# Patient Record
Sex: MALE | Race: BLACK OR AFRICAN AMERICAN | NOT HISPANIC OR LATINO | Employment: FULL TIME | URBAN - METROPOLITAN AREA
[De-identification: names, ages, dates, MRNs, and addresses within clinical notes are randomized per-mention and may not be internally consistent; named-entity substitution may affect disease eponyms.]

---

## 2020-10-28 LAB
CRC RECOMMENDATION EXT: NORMAL
CRC RECOMMENDATION EXT: NORMAL

## 2021-07-14 ENCOUNTER — HISTORICAL (OUTPATIENT)
Dept: ADMINISTRATIVE | Facility: HOSPITAL | Age: 56
End: 2021-07-14

## 2021-07-14 LAB
RET# (OHS): 0.06 X10^6/ML (ref 0.03–0.1)
RETICULOCYTE COUNT AUTOMATED (OLG): 1.3 % (ref 1.1–2.1)

## 2022-11-24 ENCOUNTER — HOSPITAL ENCOUNTER (EMERGENCY)
Facility: HOSPITAL | Age: 57
Discharge: HOME | End: 2022-11-24
Attending: STUDENT IN AN ORGANIZED HEALTH CARE EDUCATION/TRAINING PROGRAM
Payer: COMMERCIAL

## 2022-11-24 VITALS
OXYGEN SATURATION: 99 % | SYSTOLIC BLOOD PRESSURE: 103 MMHG | BODY MASS INDEX: 21.43 KG/M2 | DIASTOLIC BLOOD PRESSURE: 64 MMHG | TEMPERATURE: 99 F | WEIGHT: 167 LBS | HEART RATE: 82 BPM | RESPIRATION RATE: 18 BRPM | HEIGHT: 74 IN

## 2022-11-24 DIAGNOSIS — B34.9 VIRAL SYNDROME: ICD-10-CM

## 2022-11-24 DIAGNOSIS — R53.1 WEAKNESS GENERALIZED: Primary | ICD-10-CM

## 2022-11-24 LAB
ABS NEUT (OLG): 5.92 X10(3)/MCL (ref 2.1–9.2)
ALBUMIN SERPL-MCNC: 3 GM/DL (ref 3.5–5)
ALBUMIN/GLOB SERPL: 0.8 RATIO (ref 1.1–2)
ALP SERPL-CCNC: 80 UNIT/L (ref 40–150)
ALT SERPL-CCNC: 105 UNIT/L (ref 0–55)
APPEARANCE UR: CLEAR
AST SERPL-CCNC: 115 UNIT/L (ref 5–34)
BACTERIA #/AREA URNS AUTO: ABNORMAL /HPF
BILIRUB UR QL STRIP.AUTO: NEGATIVE MG/DL
BILIRUBIN DIRECT+TOT PNL SERPL-MCNC: 1.6 MG/DL
BUN SERPL-MCNC: 16 MG/DL (ref 8.4–25.7)
CALCIUM SERPL-MCNC: 9.1 MG/DL (ref 8.4–10.2)
CHLORIDE SERPL-SCNC: 97 MMOL/L (ref 98–107)
CO2 SERPL-SCNC: 27 MMOL/L (ref 22–29)
COLOR UR AUTO: YELLOW
CREAT SERPL-MCNC: 1.19 MG/DL (ref 0.73–1.18)
EOSINOPHIL NFR BLD MANUAL: 0.07 X10(3)/MCL (ref 0–0.9)
EOSINOPHIL NFR BLD MANUAL: 1 %
ERYTHROCYTE [DISTWIDTH] IN BLOOD BY AUTOMATED COUNT: 13.4 % (ref 11.5–17)
FLUAV AG UPPER RESP QL IA.RAPID: NOT DETECTED
FLUBV AG UPPER RESP QL IA.RAPID: NOT DETECTED
GFR SERPLBLD CREATININE-BSD FMLA CKD-EPI: >60 MLS/MIN/1.73/M2
GLOBULIN SER-MCNC: 4 GM/DL (ref 2.4–3.5)
GLUCOSE SERPL-MCNC: 127 MG/DL (ref 74–100)
GLUCOSE UR QL STRIP.AUTO: NEGATIVE MG/DL
HCT VFR BLD AUTO: 36.7 % (ref 42–52)
HGB BLD-MCNC: 12.2 GM/DL (ref 14–18)
IMM GRANULOCYTES # BLD AUTO: 0.05 X10(3)/MCL (ref 0–0.04)
IMM GRANULOCYTES NFR BLD AUTO: 0.7 %
INSTRUMENT WBC (OLG): 7.4 X10(3)/MCL
KETONES UR QL STRIP.AUTO: NEGATIVE MG/DL
LEUKOCYTE ESTERASE UR QL STRIP.AUTO: NEGATIVE UNIT/L
LYMPHOCYTES NFR BLD MANUAL: 1.18 X10(3)/MCL
LYMPHOCYTES NFR BLD MANUAL: 16 %
MCH RBC QN AUTO: 26.5 PG (ref 27–31)
MCHC RBC AUTO-ENTMCNC: 33.2 MG/DL (ref 33–36)
MCV RBC AUTO: 79.8 FL (ref 80–94)
MONOCYTES NFR BLD MANUAL: 0.3 X10(3)/MCL (ref 0.1–1.3)
MONOCYTES NFR BLD MANUAL: 4 %
NEUTROPHILS NFR BLD MANUAL: 80 %
NITRITE UR QL STRIP.AUTO: NEGATIVE
NRBC BLD AUTO-RTO: 0 %
PH UR STRIP.AUTO: 5.5 [PH]
PLATELET # BLD AUTO: 191 X10(3)/MCL (ref 130–400)
PLATELET # BLD EST: NORMAL 10*3/UL
PMV BLD AUTO: 11.5 FL (ref 7.4–10.4)
POTASSIUM SERPL-SCNC: 3.6 MMOL/L (ref 3.5–5.1)
PROT SERPL-MCNC: 7 GM/DL (ref 6.4–8.3)
PROT UR QL STRIP.AUTO: ABNORMAL MG/DL
RBC # BLD AUTO: 4.6 X10(6)/MCL (ref 4.7–6.1)
RBC #/AREA URNS AUTO: <5 /HPF
RBC MORPH BLD: NORMAL
RBC UR QL AUTO: ABNORMAL UNIT/L
SARS-COV-2 RNA RESP QL NAA+PROBE: NOT DETECTED
SODIUM SERPL-SCNC: 137 MMOL/L (ref 136–145)
SP GR UR STRIP.AUTO: 1.01 (ref 1–1.03)
SQUAMOUS #/AREA URNS AUTO: <5 /HPF
TROPONIN I SERPL-MCNC: <0.01 NG/ML (ref 0–0.04)
UROBILINOGEN UR STRIP-ACNC: 1 MG/DL
WBC # SPEC AUTO: 7.4 X10(3)/MCL (ref 4.5–11.5)
WBC #/AREA URNS AUTO: <5 /HPF
YEAST URNS QL MICRO: ABNORMAL /HPF

## 2022-11-24 PROCEDURE — 81003 URINALYSIS AUTO W/O SCOPE: CPT | Performed by: NURSE PRACTITIONER

## 2022-11-24 PROCEDURE — 81001 URINALYSIS AUTO W/SCOPE: CPT | Performed by: NURSE PRACTITIONER

## 2022-11-24 PROCEDURE — 99285 EMERGENCY DEPT VISIT HI MDM: CPT | Mod: 25

## 2022-11-24 PROCEDURE — 85007 BL SMEAR W/DIFF WBC COUNT: CPT | Performed by: NURSE PRACTITIONER

## 2022-11-24 PROCEDURE — 84484 ASSAY OF TROPONIN QUANT: CPT | Performed by: NURSE PRACTITIONER

## 2022-11-24 PROCEDURE — 0240U COVID/FLU A&B PCR: CPT | Performed by: NURSE PRACTITIONER

## 2022-11-24 PROCEDURE — 80053 COMPREHEN METABOLIC PANEL: CPT | Performed by: NURSE PRACTITIONER

## 2022-11-24 PROCEDURE — 93010 ELECTROCARDIOGRAM REPORT: CPT | Mod: ,,, | Performed by: INTERNAL MEDICINE

## 2022-11-24 PROCEDURE — 93010 EKG 12-LEAD: ICD-10-PCS | Mod: ,,, | Performed by: INTERNAL MEDICINE

## 2022-11-24 PROCEDURE — 25000003 PHARM REV CODE 250: Performed by: NURSE PRACTITIONER

## 2022-11-24 PROCEDURE — 93005 ELECTROCARDIOGRAM TRACING: CPT

## 2022-11-24 RX ORDER — BENAZEPRIL HYDROCHLORIDE 20 MG/1
20 TABLET ORAL DAILY
COMMUNITY
Start: 2022-10-27 | End: 2023-11-14 | Stop reason: SDUPTHER

## 2022-11-24 RX ORDER — HYDROCHLOROTHIAZIDE 12.5 MG/1
12.5 CAPSULE ORAL DAILY
COMMUNITY
Start: 2022-10-27 | End: 2023-08-09 | Stop reason: ALTCHOICE

## 2022-11-24 RX ORDER — LEVOFLOXACIN 500 MG/1
500 TABLET, FILM COATED ORAL DAILY
COMMUNITY
Start: 2022-11-22 | End: 2023-08-09 | Stop reason: ALTCHOICE

## 2022-11-24 RX ORDER — ALLOPURINOL 300 MG/1
300 TABLET ORAL DAILY
COMMUNITY
Start: 2022-09-02 | End: 2023-08-15 | Stop reason: SDUPTHER

## 2022-11-24 RX ADMIN — SODIUM CHLORIDE 1000 ML: 9 INJECTION, SOLUTION INTRAVENOUS at 09:11

## 2022-11-24 NOTE — FIRST PROVIDER EVALUATION
"Medical screening examination initiated.  I have conducted a focused provider triage encounter, findings are as follows:    Brief history of present illness:  Patient states fever and generalized weakness.    Vitals:    11/24/22 1718   BP: 103/64   Pulse: 82   Resp: 18   Temp: 98.9 °F (37.2 °C)   SpO2: 99%   Weight: 75.8 kg (167 lb)   Height: 6' 2" (1.88 m)       Pertinent physical exam:  Awake, alert, ambulatory      Brief workup plan:  labs    Preliminary workup initiated; this workup will be continued and followed by the physician or advanced practice provider that is assigned to the patient when roomed.  "

## 2022-11-25 NOTE — ED PROVIDER NOTES
Encounter Date: 11/24/2022       History     Chief Complaint   Patient presents with    Fever     Patient reports fever for 4 days, also reports weakness     Patient is a 57-year-old male  that presents with fever that has been present few days. Associated symptoms epigastric pain intermittent. Surrounding information is went to his PCP and was prescribed Levaquin. Exacerbated by nothing. Relieved by nothing. Patient treatment prior to arrival Levaquin. Risk factors include none. Other history pertaining to this complaint nothing.       The history is provided by the patient. No  was used.   Review of patient's allergies indicates:  No Known Allergies  Past Medical History:   Diagnosis Date    Hypertension      Past Surgical History:   Procedure Laterality Date    none       History reviewed. No pertinent family history.  Social History     Tobacco Use    Smoking status: Former     Types: Cigarettes    Smokeless tobacco: Never   Substance Use Topics    Alcohol use: Yes     Comment: beer daily    Drug use: Not Currently     Review of Systems   Constitutional:  Positive for fever.   Respiratory:  Negative for cough and shortness of breath.    Cardiovascular:  Negative for chest pain.   Gastrointestinal:  Positive for abdominal pain.   Genitourinary:  Negative for difficulty urinating and dysuria.   Musculoskeletal:  Negative for gait problem.   Skin:  Negative for color change.   Neurological:  Negative for dizziness, speech difficulty and headaches.   Psychiatric/Behavioral:  Negative for hallucinations and suicidal ideas.    All other systems reviewed and are negative.    Physical Exam     Initial Vitals [11/24/22 1718]   BP Pulse Resp Temp SpO2   103/64 82 18 98.9 °F (37.2 °C) 99 %      MAP       --         Physical Exam    Nursing note and vitals reviewed.  Constitutional: He appears well-developed and well-nourished.   HENT:   Head: Normocephalic.   Eyes: EOM are normal.   Neck: Neck supple.    Normal range of motion.  Cardiovascular:  Normal rate, regular rhythm, normal heart sounds and intact distal pulses.           Pulmonary/Chest: Breath sounds normal.   Abdominal: Abdomen is soft. Bowel sounds are normal.   Mild right upper quadrant tenderness   Musculoskeletal:         General: Normal range of motion.      Cervical back: Normal range of motion and neck supple.     Neurological: He is alert and oriented to person, place, and time. He has normal strength.   Skin: Skin is warm and dry. Capillary refill takes less than 2 seconds.   Psychiatric: He has a normal mood and affect. His behavior is normal. Judgment and thought content normal.       ED Course   Procedures  Labs Reviewed   COMPREHENSIVE METABOLIC PANEL - Abnormal; Notable for the following components:       Result Value    Chloride 97 (*)     Glucose Level 127 (*)     Creatinine 1.19 (*)     Albumin Level 3.0 (*)     Globulin 4.0 (*)     Albumin/Globulin Ratio 0.8 (*)     Bilirubin Total 1.6 (*)     Alanine Aminotransferase 105 (*)     Aspartate Aminotransferase 115 (*)     All other components within normal limits   URINALYSIS, REFLEX TO URINE CULTURE - Abnormal; Notable for the following components:    Protein, UA 1+ (*)     Blood, UA 1+ (*)     All other components within normal limits   CBC WITH DIFFERENTIAL - Abnormal; Notable for the following components:    RBC 4.60 (*)     Hgb 12.2 (*)     Hct 36.7 (*)     MCV 79.8 (*)     MCH 26.5 (*)     MPV 11.5 (*)     IG# 0.05 (*)     All other components within normal limits   URINALYSIS, MICROSCOPIC - Abnormal; Notable for the following components:    Yeast, UA Rare (*)     All other components within normal limits   TROPONIN I - Normal   COVID/FLU A&B PCR - Normal    Narrative:     The Xpert Xpress SARS-CoV-2/FLU/RSV plus is a rapid, multiplexed real-time PCR test intended for the simultaneous qualitative detection and differentiation of SARS-CoV-2, Influenza A, Influenza B, and respiratory  syncytial virus (RSV) viral RNA in either nasopharyngeal swab or nasal swab specimens.         CBC W/ AUTO DIFFERENTIAL    Narrative:     The following orders were created for panel order CBC Auto Differential.  Procedure                               Abnormality         Status                     ---------                               -----------         ------                     CBC with Differential[[id number]]        Abnormal            Final result               Manual Differential[[id number]]                              Final result                 Please view results for these tests on the individual orders.   MANUAL DIFFERENTIAL          Imaging Results              US Abdomen Limited_Gallbladder (Preliminary result)  Result time 11/24/22 22:28:22      Preliminary result by Dre Chiu MD (11/24/22 22:28:22)                   Narrative:    START OF REPORT:  Technique: Limited right upper quadrant abdominal ultrasound was performed.    Comparison: None.    Clinical History: Epigastric pain.    Findings:  Liver: The liver to the extent visualized appears unremarkable.  Biliary ducts: The common bile duct is within normal limits at 2 mm in diameter.  Gallbladder: The gallbladder appears unremarkable with no stones wall thickening (2) mm or pericholecyctic fluid. The sonographic Johnson's sign is negative.  Vascular:  Portal vein: Flow parameters are within normal limits with hepatopetal flow.      Impression:  1. Unremarkable gallbladder ultrasound. Details and other findings as noted above.                          Preliminary result by Kauli, Rad Results In (11/24/22 22:28:22)                   Narrative:    START OF REPORT:  Technique: Limited right upper quadrant abdominal ultrasound was performed.    Comparison: None.    Clinical History: Epigastric pain.    Findings:  Liver: The liver to the extent visualized appears unremarkable.  Biliary ducts: The common bile duct is within normal limits at 2  mm in diameter.  Gallbladder: The gallbladder appears unremarkable with no stones wall thickening (2) mm or pericholecyctic fluid. The sonographic Johnson's sign is negative.  Vascular:  Portal vein: Flow parameters are within normal limits with hepatopetal flow.      Impression:  1. Unremarkable gallbladder ultrasound. Details and other findings as noted above.                                         X-Ray Chest PA And Lateral (Preliminary result)  Result time 11/24/22 20:31:50      ED Interpretation by SUSSY Haskins (11/24/22 20:31:50, [facility], Emergency Medicine)    No acute findings                                     Medications   sodium chloride 0.9% bolus 1,000 mL (1,000 mLs Intravenous New Bag 11/24/22 2115)                 ED Course as of 11/24/22 2212   Thu Nov 24, 2022 2212 Patient will follow up with his PCP to recheck liver functions [CL]      ED Course User Index  [CL] SUSSY Haskins                 Clinical Impression:   Final diagnoses:  [R53.1] Weakness generalized (Primary)  [B34.9] Viral syndrome        ED Disposition Condition    Discharge Stable          ED Prescriptions    None       Follow-up Information       Follow up With Specialties Details Why Contact Info    Your Primary Care Provider  Call in 3 days ed follow up              SUSSY Haskins  11/24/22 2212

## 2023-01-19 LAB
CHOLEST SERPL-MSCNC: 212 MG/DL (ref 0–200)
EGFR: 92.8
HBA1C MFR BLD: 5.6 % (ref 4–6)
HDLC SERPL-MCNC: 46 MG/DL (ref 35–70)
LDLC SERPL CALC-MCNC: 74 MG/DL (ref 0–160)
TRIGL SERPL-MCNC: 461 MG/DL (ref 40–160)
VLDL CHOLESTEROL: 92.2 MG/DL

## 2023-07-25 LAB
CHOLEST SERPL-MSCNC: 196 MG/DL (ref 0–200)
EGFR: 101
HBA1C MFR BLD: 5.9 % (ref 4–6)
HDLC SERPL-MCNC: 37 MG/DL (ref 35–70)
LDLC SERPL CALC-MCNC: 88 MG/DL (ref 0–160)
TRIGL SERPL-MCNC: 432 MG/DL (ref 40–160)
VLDL CHOLESTEROL: 71 MG/DL

## 2023-08-04 PROBLEM — E79.0 HYPERURICEMIA: Status: ACTIVE | Noted: 2023-08-04

## 2023-08-04 PROBLEM — I10 ESSENTIAL HYPERTENSION, BENIGN: Status: ACTIVE | Noted: 2023-08-04

## 2023-08-05 ENCOUNTER — TELEPHONE (OUTPATIENT)
Dept: FAMILY MEDICINE | Facility: CLINIC | Age: 58
End: 2023-08-05
Payer: COMMERCIAL

## 2023-08-05 RX ORDER — AMOXICILLIN 500 MG
1 CAPSULE ORAL EVERY OTHER DAY
COMMUNITY

## 2023-08-05 RX ORDER — IBUPROFEN 100 MG/5ML
1000 SUSPENSION, ORAL (FINAL DOSE FORM) ORAL EVERY OTHER DAY
COMMUNITY

## 2023-08-05 RX ORDER — GLUCOSAMINE HCL 500 MG
TABLET ORAL DAILY
COMMUNITY

## 2023-08-05 RX ORDER — ZINC GLUCONATE 50 MG
50 TABLET ORAL EVERY OTHER DAY
COMMUNITY

## 2023-08-07 PROBLEM — R79.89 ELEVATED LFTS: Status: ACTIVE | Noted: 2023-08-07

## 2023-08-07 PROBLEM — D57.1 SICKLE-CELL DISEASE WITHOUT CRISIS: Status: ACTIVE | Noted: 2023-08-07

## 2023-08-07 PROBLEM — E78.5 HYPERLIPIDEMIA: Status: ACTIVE | Noted: 2023-08-07

## 2023-08-07 PROBLEM — D64.9 ANEMIA, UNSPECIFIED: Status: ACTIVE | Noted: 2023-08-07

## 2023-08-07 PROBLEM — Z78.9 ALCOHOL DRINKER: Status: ACTIVE | Noted: 2023-08-07

## 2023-08-09 ENCOUNTER — OFFICE VISIT (OUTPATIENT)
Dept: FAMILY MEDICINE | Facility: CLINIC | Age: 58
End: 2023-08-09
Payer: COMMERCIAL

## 2023-08-09 VITALS
HEART RATE: 51 BPM | TEMPERATURE: 97 F | OXYGEN SATURATION: 98 % | WEIGHT: 174.38 LBS | DIASTOLIC BLOOD PRESSURE: 80 MMHG | SYSTOLIC BLOOD PRESSURE: 122 MMHG | HEIGHT: 74 IN | BODY MASS INDEX: 22.38 KG/M2 | RESPIRATION RATE: 16 BRPM

## 2023-08-09 DIAGNOSIS — E79.0 HYPERURICEMIA: ICD-10-CM

## 2023-08-09 DIAGNOSIS — E78.2 MIXED HYPERLIPIDEMIA: Primary | ICD-10-CM

## 2023-08-09 DIAGNOSIS — R73.03 PREDIABETES: ICD-10-CM

## 2023-08-09 DIAGNOSIS — I10 ESSENTIAL HYPERTENSION, BENIGN: ICD-10-CM

## 2023-08-09 DIAGNOSIS — Z79.899 ENCOUNTER FOR LONG-TERM (CURRENT) USE OF OTHER MEDICATIONS: ICD-10-CM

## 2023-08-09 DIAGNOSIS — Z87.891 EX-SMOKER: ICD-10-CM

## 2023-08-09 PROBLEM — R73.9 HYPERGLYCEMIA: Status: ACTIVE | Noted: 2023-08-09

## 2023-08-09 PROCEDURE — 3008F BODY MASS INDEX DOCD: CPT | Mod: CPTII,,, | Performed by: FAMILY MEDICINE

## 2023-08-09 PROCEDURE — 4010F ACE/ARB THERAPY RXD/TAKEN: CPT | Mod: CPTII,,, | Performed by: FAMILY MEDICINE

## 2023-08-09 PROCEDURE — 3079F PR MOST RECENT DIASTOLIC BLOOD PRESSURE 80-89 MM HG: ICD-10-PCS | Mod: CPTII,,, | Performed by: FAMILY MEDICINE

## 2023-08-09 PROCEDURE — 3074F SYST BP LT 130 MM HG: CPT | Mod: CPTII,,, | Performed by: FAMILY MEDICINE

## 2023-08-09 PROCEDURE — 3044F PR MOST RECENT HEMOGLOBIN A1C LEVEL <7.0%: ICD-10-PCS | Mod: CPTII,,, | Performed by: FAMILY MEDICINE

## 2023-08-09 PROCEDURE — 3079F DIAST BP 80-89 MM HG: CPT | Mod: CPTII,,, | Performed by: FAMILY MEDICINE

## 2023-08-09 PROCEDURE — 3044F HG A1C LEVEL LT 7.0%: CPT | Mod: CPTII,,, | Performed by: FAMILY MEDICINE

## 2023-08-09 PROCEDURE — 3074F PR MOST RECENT SYSTOLIC BLOOD PRESSURE < 130 MM HG: ICD-10-PCS | Mod: CPTII,,, | Performed by: FAMILY MEDICINE

## 2023-08-09 PROCEDURE — 99214 OFFICE O/P EST MOD 30 MIN: CPT | Mod: ,,, | Performed by: FAMILY MEDICINE

## 2023-08-09 PROCEDURE — 4010F PR ACE/ARB THEARPY RXD/TAKEN: ICD-10-PCS | Mod: CPTII,,, | Performed by: FAMILY MEDICINE

## 2023-08-09 PROCEDURE — 99214 PR OFFICE/OUTPT VISIT, EST, LEVL IV, 30-39 MIN: ICD-10-PCS | Mod: ,,, | Performed by: FAMILY MEDICINE

## 2023-08-09 PROCEDURE — 3008F PR BODY MASS INDEX (BMI) DOCUMENTED: ICD-10-PCS | Mod: CPTII,,, | Performed by: FAMILY MEDICINE

## 2023-08-09 NOTE — PROGRESS NOTES
Patient Name: Elliot Espinosa     Patient ID: [id number]     Chief Complaint: Results (Go over lab results.)      HPI:     Elliot Espinosa is a 57 y.o. male here today for lab work results. Reviewed and discussed lab work results. Patient has no specific complaints.    Past Medical History:   Diagnosis Date    Alcohol drinker     Anemia, unspecified     Elevated LFTs     Elevated lipids     Essential hypertension, benign     Hyperglycemia     Hyperuricemia     sickle cell trait     Vitamin deficiency         Past Surgical History:   Procedure Laterality Date    COLONOSCOPY      none          Social History     Socioeconomic History    Marital status:     Number of children: 1   Tobacco Use    Smoking status: Former     Current packs/day: 0.00     Types: Cigarettes    Smokeless tobacco: Never   Substance and Sexual Activity    Alcohol use: Yes     Comment: beer daily    Drug use: Never    Sexual activity: Yes        Current Outpatient Medications   Medication Instructions    allopurinoL (ZYLOPRIM) 300 mg, Oral, Daily    ascorbic acid (vitamin C) (VITAMIN C) 1,000 mg, Oral, Every other day    benazepriL (LOTENSIN) 20 mg, Oral, Daily    cholecalciferol, vitamin D3, 75 mcg (3,000 unit) Tab Oral, Daily    omega-3 fatty acids/fish oil (FISH OIL-OMEGA-3 FATTY ACIDS) 300-1,000 mg capsule 1 capsule, Oral, Every other day    zinc gluconate 50 mg, Oral, Every other day       Review of patient's allergies indicates:   Allergen Reactions    Augmentin [amoxicillin-pot clavulanate]     Pcn [penicillins]           There is no immunization history on file for this patient.    Patient Care Team:  Monty Camara Sr., MD as PCP - General (Family Medicine)     Subjective:     Review of Systems    10 point review of systems conducted, negative except as stated in the history of present illness. See HPI for details.    Objective:     Visit Vitals  /80 (BP Location: Left arm, Patient Position: Sitting, BP Method:  "Medium (Manual))   Pulse (!) 51   Temp 97 °F (36.1 °C)   Resp 16   Ht 6' 2" (1.88 m)   Wt 79.1 kg (174 lb 6.4 oz)   SpO2 98%   BMI 22.39 kg/m²       Physical Exam  Constitutional:       Appearance: Normal appearance.   HENT:      Head: Normocephalic and atraumatic.   Cardiovascular:      Rate and Rhythm: Normal rate and regular rhythm.   Pulmonary:      Effort: Pulmonary effort is normal.      Breath sounds: Normal breath sounds.   Abdominal:      Palpations: Abdomen is soft.      Tenderness: There is no abdominal tenderness.   Musculoskeletal:         General: No swelling or tenderness. Normal range of motion.      Cervical back: Normal range of motion and neck supple.      Right lower leg: No edema.      Left lower leg: No edema.   Lymphadenopathy:      Cervical: No cervical adenopathy.   Skin:     General: Skin is warm and dry.   Neurological:      General: No focal deficit present.      Mental Status: He is alert and oriented to person, place, and time.   Psychiatric:         Mood and Affect: Mood normal.           Assessment:       ICD-10-CM ICD-9-CM   1. Mixed hyperlipidemia  E78.2 272.2   2. Prediabetes  R73.03 790.29   3. Essential hypertension, benign  I10 401.1   4. Hyperuricemia  E79.0 790.6   5. Ex-smoker  Z87.891 V15.82   6. Encounter for long-term (current) use of other medications  Z79.899 V58.69        Plan:     1. Mixed hyperlipidemia  Overview:  Patient TGY is not at goal.  Stressed importance of dietary modifications. Follow a low cholesterol, low saturated fat diet with less that 200mg of cholesterol a day.  Avoid fried foods and high saturated fats (high saturated fats less than 7% of calories).  Add Flax Seed/Fish Oil supplements to diet. Increase dietary fiber.  Regular exercise can reduce LDL and raise HDL. Stressed importance of physical activity 5 times per week for 30 minutes per day.     Assessment & Plan:  Most recent  mg/dL and LDL 88 mg/dL on 7/25/2023.  Will increase OTC Fish " Oil-Omega-3 Fatty Acids 300-1000mg to 1 pill QAM and 2 pills HS.  Patient has not been very compliant with his fish oil regimen.  Compliance was reinforced.    Orders:  -     Lipid Panel; Future; Expected date: 12/09/2023    2. Prediabetes  Overview:  Patient is well controlled with diet.  Follow ADA Diet. Avoid soda, simple sweets, and limit rice/pasta/breads/starches (no more than 45-50 grams per meal).  Maintain healthy weight with goal BMI <30.  Exercise 5 times per week for 30 minutes per day.  Stressed importance of daily foot exams especially if neuropathy is present.  Patient was instructed to notify physician if they notice any sores or skin lesions on the feet.  Stressed the importance of wearing proper fitting comfortable shoes i.e. diabetic footwear.  They were instructed to always wear shoes and never go barefooted.  Stressed importance of annual dilated eye exam.    Assessment & Plan:  Most recent HbA1c 5.9% on 7/25/2023.    Orders:  -     Hemoglobin A1C; Future; Expected date: 12/09/2023    3. Essential hypertension, benign  Overview:  Continue with Benazepril 20 mg daily.  Patient is well controlled.  Low Sodium Diet (DASH Diet - Less than 2 grams of sodium per day).  Monitor blood pressure daily and log. Report consistent numbers greater than 140/90.  Maintain healthy weight with goal BMI <30. Exercise 30 minutes per day, 5 days per week.      4. Hyperuricemia  Overview:  Patient is well controlled.  Stay well hydrated by increasing water intake throughout the day.  Stressed importance of exercise and weight loss to maintain BMI <30.  Avoid alcohol, sodas, organ/glandular meats (liver, kidney, sweetbreads). Limit seafood and red meat intake.  Eat a balanced diet of fruits, vegetables, complex carbohydrates, and lean sources of protein (boneless/skinless chicken breasts, salmon, lentils, low fat dairy).  Discussed possible benefit of OTC Vitamin C supplementation.     Assessment & Plan:  Most recent  Uric Acid 3.8 mg/dL on 7/25/2023.  Will decrease Allopurinol 300 mg to QOD and will continue to monitor.    Orders:  -     Uric Acid; Future; Expected date: 12/09/2023    5. Ex-smoker  -     X-Ray Chest PA And Lateral; Future; Expected date: 08/09/2023    6. Encounter for long-term (current) use of other medications  Overview:  Patient was instructed to continue with the prescribed medications as no adverse or cost issues were found.   Refills were given if needed.  Compliance issues were discussed as far as medications that patient is currently taking.  Discussed the possibility of getting off some medications that were not absolutely necessary.    Orders:  -     Testosterone Panel; Future; Expected date: 12/09/2023        [x] Discussed lab findings with the patient.  [x] Discussed diet, exercise and if appropriate, weight loss.  [x] Instructions and information, including risks and benefits of prescribed medication(s) have been reviewed with the patient and patient verbalizes understanding. Questions have been answered to the patient's satisfaction.  [] Appropriate counseling has been given regarding anxiety and depressive issues that were discussed today.  [] Any lab drawn today will be reviewed by physician at the time it is received and appropriate recommendations bill be made and discussed with patient.     Follow up in about 4 months (around 12/9/2023) for Follow Up, With Fasting Labs prior to visit.   In addition to their scheduled follow up, the patient has also been instructed to follow up on as needed basis.     Future Appointments   Date Time Provider Department Center   12/7/2023  1:40 PM NURSE, [facility] HARJIT Camara Sr, MD

## 2023-08-09 NOTE — ASSESSMENT & PLAN NOTE
Most recent Uric Acid 3.8 mg/dL on 7/25/2023.  Will decrease Allopurinol 300 mg to QOD and will continue to monitor.

## 2023-08-09 NOTE — ASSESSMENT & PLAN NOTE
Most recent  mg/dL and LDL 88 mg/dL on 7/25/2023.  Will increase OTC Fish Oil-Omega-3 Fatty Acids 300-1000mg to 1 pill QAM and 2 pills HS.  Patient has not been very compliant with his fish oil regimen.  Compliance was reinforced.

## 2023-08-10 ENCOUNTER — DOCUMENTATION ONLY (OUTPATIENT)
Dept: FAMILY MEDICINE | Facility: CLINIC | Age: 58
End: 2023-08-10
Payer: COMMERCIAL

## 2023-08-15 DIAGNOSIS — E79.0 HYPERURICEMIA: Primary | ICD-10-CM

## 2023-08-15 RX ORDER — ALLOPURINOL 300 MG/1
300 TABLET ORAL DAILY
Qty: 90 TABLET | Refills: 1 | Status: SHIPPED | OUTPATIENT
Start: 2023-08-15

## 2023-08-18 ENCOUNTER — TELEPHONE (OUTPATIENT)
Dept: FAMILY MEDICINE | Facility: CLINIC | Age: 58
End: 2023-08-18
Payer: COMMERCIAL

## 2023-08-18 NOTE — TELEPHONE ENCOUNTER
Spoke with patient and informed him he was not taken off of that medicine and it was filled at [facility] on 8/15/2023 and should be ready for .   central venous access

## 2023-08-18 NOTE — TELEPHONE ENCOUNTER
----- Message from Eliane Storey sent at 8/17/2023  3:24 PM CDT -----  Regarding: Medication  Did Doc take him off his Gout medicine?  [facility]

## 2023-09-11 ENCOUNTER — TELEPHONE (OUTPATIENT)
Dept: FAMILY MEDICINE | Facility: CLINIC | Age: 58
End: 2023-09-11
Payer: COMMERCIAL

## 2023-09-11 NOTE — TELEPHONE ENCOUNTER
----- Message from Monty Camara Sr., MD sent at 8/19/2023 11:17 AM CDT -----  We recently did a chest x-ray on Elliot .  Overall it looked okay, there appears to be some mild scarring from past smoking.  We will repeat the chest x-ray or CT scan in 2 years.  ----- Message -----  From: Paz Jha MA  Sent: 8/18/2023   9:59 AM CDT  To: Monty Camara Sr., MD

## 2023-11-14 DIAGNOSIS — I10 ESSENTIAL HYPERTENSION, BENIGN: Primary | ICD-10-CM

## 2023-11-14 RX ORDER — BENAZEPRIL HYDROCHLORIDE 20 MG/1
20 TABLET ORAL DAILY
Qty: 90 TABLET | Refills: 1 | Status: SHIPPED | OUTPATIENT
Start: 2023-11-14

## 2023-12-26 ENCOUNTER — TELEPHONE (OUTPATIENT)
Dept: FAMILY MEDICINE | Facility: CLINIC | Age: 58
End: 2023-12-26
Payer: COMMERCIAL

## 2023-12-26 DIAGNOSIS — E79.0 HYPERURICEMIA: Primary | ICD-10-CM

## 2023-12-26 DIAGNOSIS — R73.03 PREDIABETES: ICD-10-CM

## 2023-12-26 DIAGNOSIS — E78.2 MIXED HYPERLIPIDEMIA: ICD-10-CM

## 2023-12-26 NOTE — TELEPHONE ENCOUNTER
----- Message from Monty Camara Sr., MD sent at 12/22/2023  1:27 PM CST -----  Tell patient his triglycerides are a little better however still elevated.  His sugar is good.  We also checked his testosterone which was normal.  Patient is to continue with his present medication.  We will recheck lab and office visit in 3 months.  Lab is to include a lipid panel, CMP, A1c and uric acid.

## 2023-12-26 NOTE — TELEPHONE ENCOUNTER
Notified patient of lab work results are within acceptable ranges for age and conditions. Patient Triglyceride level has improved since previous lab, his HbA1c is 5.8%, and his Testosterone level is normal. No change in therapy is requested at this time and continue with current treatment regimen. Message sent to Evonne to schedule patient for fasting lab work appointment for CMP, Lipid Panel, HbA1c, and Uric Acid prior to follow up office visit with Dr. Camara in 3 months.Patient verbalizes understanding.

## 2024-04-02 ENCOUNTER — OFFICE VISIT (OUTPATIENT)
Dept: FAMILY MEDICINE | Facility: CLINIC | Age: 59
End: 2024-04-02
Payer: COMMERCIAL

## 2024-04-02 VITALS
SYSTOLIC BLOOD PRESSURE: 130 MMHG | DIASTOLIC BLOOD PRESSURE: 85 MMHG | TEMPERATURE: 98 F | RESPIRATION RATE: 17 BRPM | OXYGEN SATURATION: 98 % | WEIGHT: 174.63 LBS | HEIGHT: 74 IN | HEART RATE: 66 BPM | BODY MASS INDEX: 22.41 KG/M2

## 2024-04-02 DIAGNOSIS — Z12.5 SCREENING PSA (PROSTATE SPECIFIC ANTIGEN): ICD-10-CM

## 2024-04-02 DIAGNOSIS — R73.03 PREDIABETES: ICD-10-CM

## 2024-04-02 DIAGNOSIS — D64.9 ANEMIA, UNSPECIFIED TYPE: ICD-10-CM

## 2024-04-02 DIAGNOSIS — E79.0 HYPERURICEMIA: ICD-10-CM

## 2024-04-02 DIAGNOSIS — E78.2 MIXED HYPERLIPIDEMIA: ICD-10-CM

## 2024-04-02 DIAGNOSIS — I10 ESSENTIAL HYPERTENSION, BENIGN: Primary | ICD-10-CM

## 2024-04-02 PROCEDURE — 1159F MED LIST DOCD IN RCRD: CPT | Mod: CPTII,,, | Performed by: FAMILY MEDICINE

## 2024-04-02 PROCEDURE — 3075F SYST BP GE 130 - 139MM HG: CPT | Mod: CPTII,,, | Performed by: FAMILY MEDICINE

## 2024-04-02 PROCEDURE — 99214 OFFICE O/P EST MOD 30 MIN: CPT | Mod: ,,, | Performed by: FAMILY MEDICINE

## 2024-04-02 PROCEDURE — 3079F DIAST BP 80-89 MM HG: CPT | Mod: CPTII,,, | Performed by: FAMILY MEDICINE

## 2024-04-02 PROCEDURE — 4010F ACE/ARB THERAPY RXD/TAKEN: CPT | Mod: CPTII,,, | Performed by: FAMILY MEDICINE

## 2024-04-02 PROCEDURE — 3044F HG A1C LEVEL LT 7.0%: CPT | Mod: CPTII,,, | Performed by: FAMILY MEDICINE

## 2024-04-02 PROCEDURE — 3008F BODY MASS INDEX DOCD: CPT | Mod: CPTII,,, | Performed by: FAMILY MEDICINE

## 2024-04-02 NOTE — ASSESSMENT & PLAN NOTE
Lab Results   Component Value Date    HGBA1C 5.8 (H) 03/27/2024    HGBA1C 5.9 07/25/2023     Patient is at goal.

## 2024-04-02 NOTE — PROGRESS NOTES
Patient Name: Elliot Espinosa     Patient ID: [id number]     Chief Complaint: Results (Go over lab results.)      HPI:     Elliot Espinosa is a 58 y.o. male here today for follow up on hypertension, hyperlipidemia, prediabetes, and lab results.  Past Medical History:   Diagnosis Date    Alcohol drinker     Anemia, unspecified     Elevated LFTs     Elevated lipids     Essential hypertension, benign     Hyperglycemia     Hyperuricemia     sickle cell trait     Vitamin deficiency         Past Surgical History:   Procedure Laterality Date    COLONOSCOPY      none          Social History     Socioeconomic History    Marital status:     Number of children: 1   Tobacco Use    Smoking status: Former     Types: Cigarettes    Smokeless tobacco: Never   Substance and Sexual Activity    Alcohol use: Yes     Comment: beer daily    Drug use: Never    Sexual activity: Yes     Social Determinants of Health     Financial Resource Strain: Low Risk  (4/2/2024)    Overall Financial Resource Strain (CARDIA)     Difficulty of Paying Living Expenses: Not hard at all   Food Insecurity: No Food Insecurity (4/2/2024)    Hunger Vital Sign     Worried About Running Out of Food in the Last Year: Never true     Ran Out of Food in the Last Year: Never true   Transportation Needs: No Transportation Needs (4/2/2024)    PRAPARE - Transportation     Lack of Transportation (Medical): No     Lack of Transportation (Non-Medical): No   Physical Activity: Insufficiently Active (4/2/2024)    Exercise Vital Sign     Days of Exercise per Week: 3 days     Minutes of Exercise per Session: 30 min   Stress: No Stress Concern Present (4/2/2024)    [employer] - Occupational Stress Questionnaire     Feeling of Stress : Not at all   Social Connections: Moderately Integrated (4/2/2024)    Social Connection and Isolation Panel [NHANES]     Frequency of Communication with Friends and Family: Twice a week     Frequency of Social  "Gatherings with Friends and Family: Twice a week     Attends [employer] Services: 1 to 4 times per year     Active Member of Clubs or Organizations: No     Attends Club or Organization Meetings: Never     Marital Status:    Housing Stability: Unknown (4/2/2024)    Housing Stability Vital Sign     Unable to Pay for Housing in the Last Year: No     Unstable Housing in the Last Year: No        Current Outpatient Medications   Medication Instructions    allopurinoL (ZYLOPRIM) 300 mg, Oral, Daily    ascorbic acid (vitamin C) (VITAMIN C) 1,000 mg, Oral, Every other day    benazepriL (LOTENSIN) 20 mg, Oral, Daily    cholecalciferol, vitamin D3, 75 mcg (3,000 unit) Tab Oral, Daily    omega-3 fatty acids/fish oil (FISH OIL-OMEGA-3 FATTY ACIDS) 300-1,000 mg capsule 1 capsule, Oral, Every other day    zinc gluconate 50 mg, Oral, Every other day       Review of patient's allergies indicates:   Allergen Reactions    Augmentin [amoxicillin-pot clavulanate]     Pcn [penicillins]         Immunization History   Administered Date(s) Administered    COVID-19, MRNA, LN-S, PF (Pfizer) (Purple Cap) 07/14/2021, 08/04/2021, 02/25/2022    COVID-19, mRNA, LNP-S, bivalent booster, PF (PFIZER OMICRON) 10/27/2022       Patient Care Team:  Monty Camara Sr., MD as PCP - General (Family Medicine)     Subjective:     Review of Systems    10 point review of systems conducted, negative except as stated in the history of present illness. See HPI for details.    Objective:     Visit Vitals  /85 (BP Location: Left arm, Patient Position: Sitting, BP Method: Medium (Manual))   Pulse 66   Temp 97.8 °F (36.6 °C)   Resp 17   Ht 6' 2" (1.88 m)   Wt 79.2 kg (174 lb 9.6 oz)   SpO2 98%   BMI 22.42 kg/m²       Physical Exam  Constitutional:       Appearance: Normal appearance.   HENT:      Head: Normocephalic and atraumatic.   Cardiovascular:      Rate and Rhythm: Normal rate and regular rhythm.   Pulmonary:      Effort: Pulmonary effort is " normal.      Breath sounds: Normal breath sounds.   Abdominal:      Palpations: Abdomen is soft.      Tenderness: There is no abdominal tenderness.   Musculoskeletal:         General: No swelling or tenderness. Normal range of motion.      Cervical back: Normal range of motion and neck supple.      Right lower leg: No edema.      Left lower leg: No edema.   Lymphadenopathy:      Cervical: No cervical adenopathy.   Skin:     General: Skin is warm and dry.   Neurological:      General: No focal deficit present.      Mental Status: He is alert and oriented to person, place, and time.   Psychiatric:         Mood and Affect: Mood normal.         Assessment:       ICD-10-CM ICD-9-CM   1. Essential hypertension, benign  I10 401.1   2. Mixed hyperlipidemia  E78.2 272.2   3. Prediabetes  R73.03 790.29   4. Hyperuricemia  E79.0 790.6   5. Anemia, unspecified type  D64.9 285.9   6. Screening PSA (prostate specific antigen)  Z12.5 V76.44       Plan:   1. Essential hypertension, benign  Overview:  Continue with Benazepril 20 mg daily.    Low Sodium Diet (DASH Diet - Less than 2 grams of sodium per day).  Monitor blood pressure daily and log. Report consistent numbers greater than 140/90.  Maintain healthy weight with goal BMI <30. Exercise 30 minutes per day, 5 days per week.    Assessment & Plan:  Blood pressure is well controlled and at goal.    Orders:  -     CBC Auto Differential; Future; Expected date: 07/30/2024    2. Mixed hyperlipidemia  Overview:    Stressed importance of dietary modifications. Follow a low cholesterol, low saturated fat diet with less that 200mg of cholesterol a day.  Avoid fried foods and high saturated fats (high saturated fats less than 7% of calories).  Add Flax Seed/Fish Oil supplements to diet. Increase dietary fiber.  Regular exercise can reduce LDL and raise HDL. Stressed importance of physical activity 5 times per week for 30 minutes per day.     Assessment & Plan:  Lab Results   Component  Value Date    TRIG 219 (H) 03/27/2024    HDL 49 03/27/2024    LDLCALC 91 03/27/2024     Patient's LDL is at goal however his triglycerides are elevated.  We have the patient taking fish oil for his triglycerides.  We will continue to monitor.  I did suggest that he have a CT calcium score to assess for any coronary disease.      Orders:  -     Lipid Panel; Future; Expected date: 07/30/2024    3. Prediabetes  Overview:  Patient is well controlled with diet.  Follow ADA Diet. Avoid soda, simple sweets, and limit rice/pasta/breads/starches (no more than 45-50 grams per meal).  Maintain healthy weight with goal BMI <30.  Exercise 5 times per week for 30 minutes per day.  Stressed importance of daily foot exams especially if neuropathy is present.  Patient was instructed to notify physician if they notice any sores or skin lesions on the feet.  Stressed the importance of wearing proper fitting comfortable shoes i.e. diabetic footwear.  They were instructed to always wear shoes and never go barefooted.  Stressed importance of annual dilated eye exam.    Assessment & Plan:  Lab Results   Component Value Date    HGBA1C 5.8 (H) 03/27/2024    HGBA1C 5.9 07/25/2023     Patient is at goal.     Orders:  -     Hemoglobin A1C; Future; Expected date: 07/30/2024    4. Hyperuricemia  Overview:  Current Medication: Allopurinol 300 mg one po qod.  Stay well hydrated by increasing water intake throughout the day.  Stressed importance of exercise and weight loss to maintain BMI <30.  Avoid alcohol, sodas, organ/glandular meats (liver, kidney, sweetbreads). Limit seafood and red meat intake.  Eat a balanced diet of fruits, vegetables, complex carbohydrates, and lean sources of protein (boneless/skinless chicken breasts, salmon, lentils, low fat dairy).  Discussed possible benefit of OTC Vitamin C supplementation.     Assessment & Plan:  03/27/2024 Uric Acid level = 5.6. Level is   well controlled and at goal.      5. Anemia, unspecified  type  Overview:  Patient has a remote history of microcytic anemia.    Assessment & Plan:  We will reassess his anemia status with a CBC, iron and ferritin at his next visit.    Orders:  -     Iron; Future; Expected date: 07/30/2024  -     Ferritin; Future; Expected date: 07/30/2024    6. Screening PSA (prostate specific antigen)  -     PSA, Screening; Future; Expected date: 07/30/2024        [x] Discussed lab findings with the patient.  [x] Discussed diet, exercise and if appropriate, weight loss.  [] Instructions and information, including risks and benefits of prescribed medication(s) have been reviewed with the patient and patient verbalizes understanding. Questions have been answered to the patient's satisfaction.  [] Appropriate counseling has been given regarding anxiety and depressive issues that were discussed today.  [] Any lab drawn today will be reviewed by physician at the time it is received and appropriate recommendations bill be made and discussed with patient.     Follow up in about 4 months (around 8/2/2024) for With Fasting Labs prior to visit.   In addition to their scheduled follow up, the patient has also been instructed to follow up on as needed basis.     Future Appointments   Date Time Provider Department Center   7/30/2024  7:20 AM [facility], [facility]   8/1/2024  3:30 PM Monty Camara Sr., MD [facility] Jb Bourgeois Sr, MD

## 2024-04-02 NOTE — ASSESSMENT & PLAN NOTE
Lab Results   Component Value Date    TRIG 219 (H) 03/27/2024    HDL 49 03/27/2024    LDLCALC 91 03/27/2024     Patient's LDL is at goal however his triglycerides are elevated.  We have the patient taking fish oil for his triglycerides.  We will continue to monitor.  I did suggest that he have a CT calcium score to assess for any coronary disease.

## 2024-04-30 DIAGNOSIS — E79.0 HYPERURICEMIA: ICD-10-CM

## 2024-04-30 DIAGNOSIS — I10 ESSENTIAL HYPERTENSION, BENIGN: ICD-10-CM

## 2024-04-30 RX ORDER — BENAZEPRIL HYDROCHLORIDE 20 MG/1
20 TABLET ORAL DAILY
Qty: 90 TABLET | Refills: 1 | Status: SHIPPED | OUTPATIENT
Start: 2024-04-30

## 2024-04-30 RX ORDER — ALLOPURINOL 300 MG/1
300 TABLET ORAL DAILY
Qty: 90 TABLET | Refills: 1 | Status: SHIPPED | OUTPATIENT
Start: 2024-04-30

## 2024-07-31 DIAGNOSIS — R79.89 ELEVATED FERRITIN LEVEL: Primary | ICD-10-CM

## 2024-08-08 NOTE — ASSESSMENT & PLAN NOTE
Refer to hematology for phlebotomy recommendation   Discuss alcohol  Discuss malignancy screenings

## 2024-08-08 NOTE — PROGRESS NOTES
Family Medicine    Patient ID: [id number]     Chief Complaint: Results (Patient here for lab results.)      HPI:     Elliot Espinosa is a 58 y.o. male here today for a follow up.     Past Medical History:   Diagnosis Date    Alcohol drinker     Anemia, unspecified     Arthritis     Elevated LFTs     Elevated lipids     Essential hypertension, benign     Hyperglycemia     Hyperuricemia     sickle cell trait     Vitamin deficiency         Past Surgical History:   Procedure Laterality Date    COLONOSCOPY  10/28/2020    Dr Dawson    Repeat 10 years    none          Social History     Tobacco Use    Smoking status: Former     Current packs/day: 0.00     Types: Cigarettes     Quit date:      Years since quittin.6    Smokeless tobacco: Never   Substance and Sexual Activity    Alcohol use: Yes     Alcohol/week: 3.0 standard drinks of alcohol     Types: 3 Cans of beer per week     Comment: beer daily    Drug use: Never    Sexual activity: Yes        Current Outpatient Medications   Medication Instructions    allopurinoL (ZYLOPRIM) 300 mg, Oral, Daily    ascorbic acid (vitamin C) (VITAMIN C) 1,000 mg, Oral, Every other day    benazepriL (LOTENSIN) 20 mg, Oral, Daily    cholecalciferol, vitamin D3, 75 mcg (3,000 unit) Tab Oral, Daily    omega-3 fatty acids/fish oil (FISH OIL-OMEGA-3 FATTY ACIDS) 300-1,000 mg capsule 1 capsule, Oral, Daily    zinc gluconate 50 mg, Oral, Every other day       Review of patient's allergies indicates:   Allergen Reactions    Augmentin [amoxicillin-pot clavulanate]     Pcn [penicillins]         Patient Care Team:  Monty Camara Sr., MD as PCP - General (Family Medicine)     Subjective:     Review of Systems   Constitutional:  Negative for activity change, appetite change, fever and unexpected weight change.   HENT:  Negative for congestion, dental problem, rhinorrhea and trouble swallowing.    Eyes:  Negative for visual disturbance.   Respiratory:  Negative for chest tightness and  "shortness of breath.    Cardiovascular:  Negative for chest pain, palpitations and leg swelling.   Gastrointestinal:  Negative for abdominal pain, blood in stool, constipation, diarrhea, nausea and vomiting.   Genitourinary:  Negative for difficulty urinating.   Musculoskeletal:  Negative for gait problem.   Skin:  Negative for rash and wound.   Neurological:  Negative for dizziness, syncope, speech difficulty, weakness and light-headedness.   Psychiatric/Behavioral:  Negative for confusion and suicidal ideas.        12 point review of systems conducted, negative except as stated in the history of present illness. See HPI for details.    Objective:     Visit Vitals  /82   Pulse 68   Temp 97.3 °F (36.3 °C)   Resp 20   Ht 6' 2" (1.88 m)   Wt 79.8 kg (176 lb)   SpO2 97%   BMI 22.60 kg/m²       Physical Exam  Vitals and nursing note reviewed.   Constitutional:       General: He is not in acute distress.     Appearance: Normal appearance. He is not ill-appearing, toxic-appearing or diaphoretic.   HENT:      Head: Normocephalic and atraumatic.      Right Ear: Tympanic membrane, ear canal and external ear normal. There is no impacted cerumen.      Left Ear: Tympanic membrane, ear canal and external ear normal. There is no impacted cerumen.      Nose: No congestion or rhinorrhea.      Mouth/Throat:      Pharynx: Oropharynx is clear. No oropharyngeal exudate or posterior oropharyngeal erythema.   Eyes:      General:         Right eye: No discharge.         Left eye: No discharge.      Extraocular Movements: Extraocular movements intact.      Conjunctiva/sclera: Conjunctivae normal.   Cardiovascular:      Rate and Rhythm: Normal rate and regular rhythm.      Heart sounds: No murmur heard.     No friction rub. No gallop.   Pulmonary:      Effort: Pulmonary effort is normal. No respiratory distress.      Breath sounds: Normal breath sounds.   Musculoskeletal:      Right lower leg: No edema.      Left lower leg: No edema. "   Skin:     Capillary Refill: Capillary refill takes less than 2 seconds.   Neurological:      Mental Status: He is alert and oriented to person, place, and time.   Psychiatric:         Mood and Affect: Mood normal.         Behavior: Behavior normal.         Thought Content: Thought content normal.         Labs Reviewed:     Chemistry:  Lab Results   Component Value Date     08/06/2024    K 4.8 08/06/2024    BUN 17 08/06/2024    CREATININE 1.01 08/06/2024    EGFRNORACEVR 87 08/06/2024    GLUCOSE 127 (H) 11/24/2022    CALCIUM 9.7 08/06/2024    ALKPHOS 80 11/24/2022    LABPROT 7.0 11/24/2022    ALBUMIN 4.6 08/06/2024    BILIDIR <0.20 12/07/2023    AST 29 08/06/2024    ALT 37 08/06/2024        Lab Results   Component Value Date    HGBA1C 5.7 (H) 07/30/2024        Hematology:  Lab Results   Component Value Date    WBC 6.0 07/30/2024    HGB 13.0 07/30/2024    HCT 39.9 07/30/2024     07/30/2024       Lipid Panel:  Lab Results   Component Value Date    CHOL 181 07/30/2024    HDL 50 07/30/2024    TRIG 221 (H) 07/30/2024        Urine:  Lab Results   Component Value Date    APPEARANCEUA Clear 11/24/2022    SGUA 1.011 11/24/2022    PROTEINUA 1+ (A) 11/24/2022    KETONESUA Negative 11/24/2022    LEUKOCYTESUR Negative 11/24/2022    RBCUA <5 11/24/2022    WBCUA <5 11/24/2022    BACTERIA None Seen 11/24/2022        Assessment:       ICD-10-CM ICD-9-CM   1. Elevated ferritin  R79.89 790.6   2. Prediabetes  R73.03 790.29   3. Mixed hyperlipidemia  E78.2 272.2   4. Encounter for screening for cardiovascular disorders  Z13.6 V81.2        Plan:     1. Elevated ferritin  Overview:  Ferritin 1300  No anemia, normal smear  TIBC within normal limits  No metabolic syndrome/obesity/diabetes   No HIV infection   ESR and CRP normal  Drinks 2-3 24 oz beers daily    Assessment & Plan:  EConsult to Hematology regarding next steps and indication for therapeutic phlebotomy    Orders:  -     E-Consult to Hemonc    2.  Prediabetes  Overview:  Patient is well controlled with diet.  Follow ADA Diet. Avoid soda, simple sweets, and limit rice/pasta/breads/starches (no more than 45-50 grams per meal).  Maintain healthy weight with goal BMI <30.  Exercise 5 times per week for 30 minutes per day.  Stressed importance of daily foot exams especially if neuropathy is present.  Patient was instructed to notify physician if they notice any sores or skin lesions on the feet.  Stressed the importance of wearing proper fitting comfortable shoes i.e. diabetic footwear.  They were instructed to always wear shoes and never go barefooted.  Stressed importance of annual dilated eye exam.    Assessment & Plan:  Lab Results   Component Value Date    HGBA1C 5.7 (H) 07/30/2024    HGBA1C 5.9 07/25/2023     Continue diet-controlled for prediabetes      3. Mixed hyperlipidemia  Overview:    Stressed importance of dietary modifications. Follow a low cholesterol, low saturated fat diet with less that 200mg of cholesterol a day.  Avoid fried foods and high saturated fats (high saturated fats less than 7% of calories).  Add Flax Seed/Fish Oil supplements to diet. Increase dietary fiber.  Regular exercise can reduce LDL and raise HDL. Stressed importance of physical activity 5 times per week for 30 minutes per day.     Assessment & Plan:  Lab Results   Component Value Date    TRIG 221 (H) 07/30/2024    HDL 50 07/30/2024    LDLCALC 94 07/30/2024     At goal   Consider calcium score before statin        4. Encounter for screening for cardiovascular disorders  -     CT Calcium Scoring Cardiac; Future; Expected date: 08/14/2024     Patient also wanted CT calcium score.  Stated that he and PCP discussed it previously and he would like it done now.    No follow-ups on file. In addition to their scheduled follow up, the patient has also been instructed to follow up on as needed basis.     No future appointments.       Norberto Eason MD

## 2024-08-08 NOTE — ASSESSMENT & PLAN NOTE
Lab Results   Component Value Date    HGBA1C 5.7 (H) 07/30/2024    HGBA1C 5.9 07/25/2023     Continue diet-controlled for prediabetes

## 2024-08-08 NOTE — ASSESSMENT & PLAN NOTE
Lab Results   Component Value Date    TRIG 221 (H) 07/30/2024    HDL 50 07/30/2024    LDLCALC 94 07/30/2024     At goal   Consider calcium score before statin

## 2024-08-14 ENCOUNTER — OFFICE VISIT (OUTPATIENT)
Dept: FAMILY MEDICINE | Facility: CLINIC | Age: 59
End: 2024-08-14
Payer: COMMERCIAL

## 2024-08-14 ENCOUNTER — E-CONSULT (OUTPATIENT)
Dept: HEMATOLOGY/ONCOLOGY | Facility: CLINIC | Age: 59
End: 2024-08-14
Payer: COMMERCIAL

## 2024-08-14 VITALS
DIASTOLIC BLOOD PRESSURE: 82 MMHG | BODY MASS INDEX: 22.59 KG/M2 | HEART RATE: 68 BPM | RESPIRATION RATE: 20 BRPM | WEIGHT: 176 LBS | SYSTOLIC BLOOD PRESSURE: 120 MMHG | HEIGHT: 74 IN | TEMPERATURE: 97 F | OXYGEN SATURATION: 97 %

## 2024-08-14 DIAGNOSIS — Z13.6 ENCOUNTER FOR SCREENING FOR CARDIOVASCULAR DISORDERS: ICD-10-CM

## 2024-08-14 DIAGNOSIS — R73.03 PREDIABETES: ICD-10-CM

## 2024-08-14 DIAGNOSIS — E78.2 MIXED HYPERLIPIDEMIA: ICD-10-CM

## 2024-08-14 DIAGNOSIS — R79.89 ELEVATED FERRITIN: Primary | ICD-10-CM

## 2024-08-14 PROCEDURE — 3074F SYST BP LT 130 MM HG: CPT | Mod: CPTII,,, | Performed by: FAMILY MEDICINE

## 2024-08-14 PROCEDURE — 3008F BODY MASS INDEX DOCD: CPT | Mod: CPTII,,, | Performed by: FAMILY MEDICINE

## 2024-08-14 PROCEDURE — 1160F RVW MEDS BY RX/DR IN RCRD: CPT | Mod: CPTII,,, | Performed by: FAMILY MEDICINE

## 2024-08-14 PROCEDURE — 1159F MED LIST DOCD IN RCRD: CPT | Mod: CPTII,,, | Performed by: FAMILY MEDICINE

## 2024-08-14 PROCEDURE — 99214 OFFICE O/P EST MOD 30 MIN: CPT | Mod: ,,, | Performed by: FAMILY MEDICINE

## 2024-08-14 PROCEDURE — 3044F HG A1C LEVEL LT 7.0%: CPT | Mod: CPTII,,, | Performed by: FAMILY MEDICINE

## 2024-08-14 PROCEDURE — 4010F ACE/ARB THERAPY RXD/TAKEN: CPT | Mod: CPTII,,, | Performed by: FAMILY MEDICINE

## 2024-08-14 PROCEDURE — 3079F DIAST BP 80-89 MM HG: CPT | Mod: CPTII,,, | Performed by: FAMILY MEDICINE

## 2024-08-14 NOTE — CONSULTS
[facility]  Response for E-Consult     Patient Name: Elliot Espinosa  MRN: [id number]  Primary Care Provider: Monty Camara Sr., MD   Requesting Provider: Norberto Eason MD  E-Consult to Hemonc  Consult performed by: Mehran Martel MD  Consult ordered by: Norberto Eason MD  Reason for consult: 58-year-old male with a history of daily alcohol use presents with ferritin of 1200, normal iron panel, no anemia, negative inflammation labs.  Other than decreasing alcohol consumption, what are the next steps?  Is therapeutic phlebotomy now indicated?  Assessment/Recommendations: ontains abnormal data Ferritin  Order: [id number]  Status: Final result       Visible to patient: No (inaccessible in MyChart)       Next appt: None       Dx: Elevated ferritin level    1 Result Note       Component Ref Range & Units 8 d ago 2 wk ago  Ferritin 30 - 400 ng/mL 1,381 High  1,217 High         Iron and TIBC  Order: [id number]  Status: Final result       Visible to patient: No (inaccessible in MyChart)       Next appt: None       Dx: Elevated ferritin level    1 Result Note       Component Ref Range & Units 8 d ago 2 wk ago  TIBC 250 - 450 ug/dL 368   UIBC 111 - 343 ug/dL 253   Iron 38 - 169 ug/dL 115 157  Iron Saturation 15 - 55 % 31   Resulting Agency  [facility]                          Recommendation: Elevated ferritin with normal iron saturation in a patient with alcohol abuse is likely secondary to alcohol consumption. Per clinic note dated today, patient drinks 2-3 24oz beers daily. This is enough to explain hyperferritenemia. In cases of hyperferritinemia with normal iron saturation attributable to alcohol consumption, first step in management is alcohol cessation. Advise complete cessation if possible. No role for phlebotomy at this time. Advise repeating ferritin and iron saturation 6 weeks after cessation of alcohol consumption. It would be reasonable to check HFE gene mutation in this patient  to exclude a comorbid hemochromatosis gene mutation. Consider repeating a liver ultrasound as elevated ferritin in heavy alcohol consumption is suggestive of liver disease.    Contingency that warrants a repeat eConsult or referral: if ferritin fails to improve with alcohol cessation please re-consult. If HFE gene mutation is positive please re-consult.     Total time of Consultation: 15 minute    I did not speak to the requesting provider verbally about this.     *This eConsult is based on the clinical data available to me and is furnished without benefit of a physical examination. The eConsult will need to be interpreted in light of any clinical issues or changes in patient status not available to me at the time of filing this eConsults. Significant changes in patient condition or level of acuity should result in immediate formal consultation and reevaluation. Please alert me if you have further questions.    Thank you for this eConsult referral.     Mehran Martel MD  [facility]

## 2024-08-15 ENCOUNTER — TELEPHONE (OUTPATIENT)
Dept: FAMILY MEDICINE | Facility: CLINIC | Age: 59
End: 2024-08-15
Payer: COMMERCIAL

## 2024-08-15 DIAGNOSIS — R79.89 ELEVATED FERRITIN: Primary | ICD-10-CM

## 2024-08-15 DIAGNOSIS — Z78.9 ALCOHOL DRINKER: ICD-10-CM

## 2024-08-15 NOTE — TELEPHONE ENCOUNTER
----- Message from Norberto Eason MD sent at 8/15/2024  7:47 AM CDT -----  The Hematology eConsult indicated that his alcohol consumption is likely causing his elevated ferritin.  Complete cessation of alcohol is recommended, with follow-up labs 6 weeks after stopping alcohol.  However if complete cessation is not possible, a significant reduction in alcohol intake is better than nothing.  Please ask patient:    1. To stop drinking alcohol as much as possible  2. To return in 6 weeks to redraw labs  3. If he is okay with getting a liver ultrasound which was recommended to see if there is any damage from his alcohol consumption

## 2024-08-15 NOTE — TELEPHONE ENCOUNTER
Dr Eason  I talked to patient regarding the( E-consult Hematology) findings. He is Ok with doing the U/S of Liver ( I placed the order already). You want him back in 6 weeks to repeat lab what other lab do you want to draw besides the ferritin level ?

## 2024-08-16 NOTE — TELEPHONE ENCOUNTER
Repeat lab Ferritin, TIBC,Iron, TIBC ,CM,CBC on 9/26/2024. Order already placed , left message for patient .

## 2024-09-04 ENCOUNTER — TELEPHONE (OUTPATIENT)
Dept: FAMILY MEDICINE | Facility: CLINIC | Age: 59
End: 2024-09-04
Payer: COMMERCIAL

## 2024-09-04 NOTE — TELEPHONE ENCOUNTER
----- Message from Norberto Eason MD sent at 8/27/2024 10:17 AM CDT -----  Normal right upper quadrant ultrasound.  Liver is normal, no further investigation.  Continue alcohol cessation

## 2024-09-05 ENCOUNTER — TELEPHONE (OUTPATIENT)
Dept: FAMILY MEDICINE | Facility: CLINIC | Age: 59
End: 2024-09-05
Payer: COMMERCIAL

## 2024-09-05 NOTE — TELEPHONE ENCOUNTER
----- Message from Norberto Eason MD sent at 8/27/2024  9:22 AM CDT -----  Calcium score is 0.  Please let patient know.  No further workup

## 2024-09-27 DIAGNOSIS — R79.89 ELEVATED FERRITIN: Primary | ICD-10-CM

## 2024-10-02 ENCOUNTER — TELEPHONE (OUTPATIENT)
Dept: FAMILY MEDICINE | Facility: CLINIC | Age: 59
End: 2024-10-02
Payer: COMMERCIAL

## 2024-10-02 NOTE — TELEPHONE ENCOUNTER
----- Message from Norberto Eason MD sent at 9/27/2024 12:15 PM CDT -----  Ferritin is significantly decreased.  Please ask patient to continue alcohol cessation and we will reassess ferritin with the labs for his next visit

## 2024-10-28 DIAGNOSIS — I10 ESSENTIAL HYPERTENSION, BENIGN: ICD-10-CM

## 2024-10-28 DIAGNOSIS — E79.0 HYPERURICEMIA: ICD-10-CM

## 2024-10-28 RX ORDER — ALLOPURINOL 300 MG/1
300 TABLET ORAL DAILY
Qty: 90 TABLET | Refills: 1 | Status: SHIPPED | OUTPATIENT
Start: 2024-10-28

## 2024-10-28 RX ORDER — BENAZEPRIL HYDROCHLORIDE 20 MG/1
20 TABLET ORAL DAILY
Qty: 90 TABLET | Refills: 1 | Status: SHIPPED | OUTPATIENT
Start: 2024-10-28

## 2024-12-09 ENCOUNTER — DOCUMENTATION ONLY (OUTPATIENT)
Facility: CLINIC | Age: 59
End: 2024-12-09
Payer: COMMERCIAL

## 2025-02-25 ENCOUNTER — OFFICE VISIT (OUTPATIENT)
Dept: FAMILY MEDICINE | Facility: CLINIC | Age: 60
End: 2025-02-25
Payer: COMMERCIAL

## 2025-02-25 VITALS
WEIGHT: 181 LBS | HEIGHT: 74 IN | BODY MASS INDEX: 23.23 KG/M2 | HEART RATE: 73 BPM | TEMPERATURE: 98 F | OXYGEN SATURATION: 96 % | RESPIRATION RATE: 20 BRPM | DIASTOLIC BLOOD PRESSURE: 85 MMHG | SYSTOLIC BLOOD PRESSURE: 122 MMHG

## 2025-02-25 DIAGNOSIS — J30.2 SEASONAL ALLERGIES: ICD-10-CM

## 2025-02-25 DIAGNOSIS — E78.2 MIXED HYPERLIPIDEMIA: ICD-10-CM

## 2025-02-25 DIAGNOSIS — R79.89 ELEVATED FERRITIN: Primary | ICD-10-CM

## 2025-02-25 DIAGNOSIS — D57.3 SICKLE CELL TRAIT: ICD-10-CM

## 2025-02-25 DIAGNOSIS — R73.03 PREDIABETES: ICD-10-CM

## 2025-02-25 DIAGNOSIS — F10.29 ALCOHOL DEPENDENCE WITH UNSPECIFIED ALCOHOL-INDUCED DISORDER: ICD-10-CM

## 2025-02-25 PROBLEM — N18.1 STAGE 1 CHRONIC KIDNEY DISEASE: Status: RESOLVED | Noted: 2025-02-25 | Resolved: 2025-02-25

## 2025-02-25 PROBLEM — N18.1 STAGE 1 CHRONIC KIDNEY DISEASE: Status: ACTIVE | Noted: 2025-02-25

## 2025-02-25 PROCEDURE — 3079F DIAST BP 80-89 MM HG: CPT | Mod: CPTII,,,

## 2025-02-25 PROCEDURE — 4010F ACE/ARB THERAPY RXD/TAKEN: CPT | Mod: CPTII,,,

## 2025-02-25 PROCEDURE — 3074F SYST BP LT 130 MM HG: CPT | Mod: CPTII,,,

## 2025-02-25 PROCEDURE — 99214 OFFICE O/P EST MOD 30 MIN: CPT | Mod: ,,,

## 2025-02-25 PROCEDURE — 1160F RVW MEDS BY RX/DR IN RCRD: CPT | Mod: CPTII,,,

## 2025-02-25 PROCEDURE — 3008F BODY MASS INDEX DOCD: CPT | Mod: CPTII,,,

## 2025-02-25 PROCEDURE — 1159F MED LIST DOCD IN RCRD: CPT | Mod: CPTII,,,

## 2025-02-25 PROCEDURE — 3044F HG A1C LEVEL LT 7.0%: CPT | Mod: CPTII,,,

## 2025-02-25 RX ORDER — HYDROCHLOROTHIAZIDE 12.5 MG/1
12.5 CAPSULE ORAL DAILY
COMMUNITY

## 2025-02-25 NOTE — ASSESSMENT & PLAN NOTE
Confirmed prediabetes diagnosis based on A1C level of 5.8.  Educated patient on pre-diabetic status and importance of dietary management.  Recommend increasing complex carbohydrates, water intake, and green leafy vegetables.

## 2025-02-25 NOTE — ASSESSMENT & PLAN NOTE
Noted ongoing sinus troubles and recent cold symptoms with deep mucus in the morning.  Explained the need for consistent daily use of antihistamines for effectiveness.  Recommended  Xyzal 5 mg nightly as an alternative to Claritin.  Recommend Mucinex expectorant OTC for mucus production and advised increasing water intake.

## 2025-02-25 NOTE — ASSESSMENT & PLAN NOTE
Discussed the impact of alcohol consumption on ferritin levels, noting an increase from 852 in September 2024 to 1057 currently.  Elliot reports drinking 2-3 24-ounce beers daily as of August last year.  Advised against abrupt cessation due to withdrawal risk and recommended gradually reducing consumption over the next 3 months.  Instructed patient to reduce sugar intake, noting sugar content in beer.

## 2025-02-25 NOTE — ASSESSMENT & PLAN NOTE
Reviewed eConsult and discussed with the patient in detail hematologist recommendations that was previously ordered by Dr. Eason.   Patient is still a heavy drinker.  I highly encouraged the patient to decrease alcohol intake.  Advised against abrupt cessation due to withdrawal risk and recommended gradually reducing consumption over the next 3 months.     Evaluated ferritin level at 1057, which is above the normal range of less than 400.  Noted iron levels are normal despite elevated ferritin.  Scheduled ferritin level recheck and ordered CBC in 3 months for closer monitoring.

## 2025-02-25 NOTE — ASSESSMENT & PLAN NOTE
Monitored blood count, which is stable with no evidence of blood loss.  Evaluated kidney function, showing mild dysfunction with current GFR at 90, down from 95 last year (range: 87-95).  Confirmed normal liver function based on ultrasound results.

## 2025-02-25 NOTE — ASSESSMENT & PLAN NOTE
Latest Reference Range & Units 12/07/23 06:35 03/27/24 07:29 07/30/24 06:30 02/11/25 06:41   Cholesterol Total 100 - 199 mg/dL 209 (H) 177 181 185   HDL >39 mg/dL 49 49 50 48   LDL Calculated 0 - 99 mg/dL 104 (H) 91 94 105 (H)   Triglycerides 0 - 149 mg/dL 328 (H) 219 (H) 221 (H) 182 (H)   VLDL Cholesterol Jan 5 - 40 mg/dL 56 (H) 37 37 32   (H): Data is abnormally high    Monitored lipid panel: LDL cholesterol increased to 105 from July of last year, while triglycerides decreased from 221 to 182.  Total cholesterol remains stable.  Considered starting statin drug but decided to continue monitoring as lipid panel is trending down.  Provided information on natural supplements for cholesterol management, including omega-3 fatty acids, flaxseed, and niacin.

## 2025-02-25 NOTE — PROGRESS NOTES
Patient ID: [id number]     Chief Complaint: Follow-up (Lab results) and consult (Consult about allergy meds besides Claritin. )    HPI:   Elliot Espinosa is a 59 year old male who presents today to discuss lab results.    ALLERGIES:   He reports experiencing sinus troubles and a deep cold in the morning, which has been clearing up. He has a history of sinus problems. He has taken Claritin intermittently in the past but not consistently.    SOCIAL HISTORY:  He quit smoking in . He reports consuming 2-3 24-ounce beers daily since August of last year and expresses willingness to reduce alcohol intake.    LABS:  Ferritin levels have increased from 852 in 2024 to 1057 currently.   GFR is 90, decreased from 95 last year, indicating mild kidney dysfunction.   LDL has increased to 105 from previous year, while triglycerides have decreased from 221 to 182.   A1C is 5.8, indicating pre-diabetes.   Iron levels are normal.          Past Medical History:   Diagnosis Date    Alcohol drinker     Anemia, unspecified     Arthritis     Elevated LFTs     Elevated lipids     Essential hypertension, benign     Hyperglycemia     Hyperuricemia     sickle cell trait     Vitamin deficiency         Past Surgical History:   Procedure Laterality Date    COLONOSCOPY  10/28/2020    Dr Dawson    Repeat 10 years    none          Social History     Socioeconomic History    Marital status:     Number of children: 1   Tobacco Use    Smoking status: Former     Current packs/day: 0.00     Types: Cigarettes     Quit date:      Years since quittin.1    Smokeless tobacco: Never   Substance and Sexual Activity    Alcohol use: Yes     Alcohol/week: 3.0 standard drinks of alcohol     Types: 3 Cans of beer per week     Comment: beer daily    Drug use: Never    Sexual activity: Yes     Social Drivers of Health     Financial Resource Strain: Low Risk  (2025)    Overall Financial Resource Strain ([facility])     Difficulty of  "Paying Living Expenses: Not hard at all   Food Insecurity: No Food Insecurity (2/25/2025)    Hunger Vital Sign     Worried About Running Out of Food in the Last Year: Never true     Ran Out of Food in the Last Year: Never true   Transportation Needs: No Transportation Needs (2/25/2025)    PRAPARE - Transportation     Lack of Transportation (Medical): No     Lack of Transportation (Non-Medical): No   Physical Activity: Insufficiently Active (2/25/2025)    Exercise Vital Sign     Days of Exercise per Week: 3 days     Minutes of Exercise per Session: 30 min   Stress: No Stress Concern Present (2/25/2025)    [employer] [place] [employer] - Occupational Stress Questionnaire     Feeling of Stress : Not at all   Housing Stability: Low Risk  (2/25/2025)    Housing Stability Vital Sign     Unable to Pay for Housing in the Last Year: No     Homeless in the Last Year: No        Current Outpatient Medications   Medication Instructions    allopurinoL (ZYLOPRIM) 300 mg, Oral, Daily    ascorbic acid (vitamin C) (VITAMIN C) 1,000 mg, Every other day    benazepriL (LOTENSIN) 20 mg, Oral, Daily    cholecalciferol, vitamin D3, 75 mcg (3,000 unit) Tab Daily    hydroCHLOROthiazide (MICROZIDE) 12.5 mg, Oral, Daily    omega-3 fatty acids/fish oil (FISH OIL-OMEGA-3 FATTY ACIDS) 300-1,000 mg capsule 1 capsule, Daily    zinc gluconate 50 mg, Every other day       Review of patient's allergies indicates:   Allergen Reactions    Augmentin [amoxicillin-pot clavulanate]     Pcn [penicillins]         Patient Care Team:  Monty Camara Sr., MD as PCP - General (Family Medicine)     Subjective:     Review of Systems    12 point review of systems conducted, negative except as stated in the history of present illness. See HPI for details.    Objective:     Visit Vitals  /85 (BP Location: Right arm, Patient Position: Sitting)   Pulse 73   Temp 98.3 °F (36.8 °C)   Resp 20   Ht 6' 2" (1.88 m)   Wt 82.1 kg (181 lb)   SpO2 96% "   BMI 23.24 kg/m²       Physical Exam    General: No acute distress. Well-developed. Well-nourished.  Eyes: EOMI. Sclerae anicteric.  HENT: Normocephalic. Atraumatic. Nares patent. Moist oral mucosa.  Ears: Bilateral TMs clear. Bilateral EACs clear.  Cardiovascular: Regular rate. Regular rhythm. No murmurs. No rubs. No gallops. Normal S1, S2.  Respiratory: Normal respiratory effort. Clear to auscultation bilaterally. No rales. No rhonchi. No wheezing.  Abdomen: Soft. Non-tender. Non-distended. Normoactive bowel sounds.  Musculoskeletal: No  obvious deformity.  Extremities: No lower extremity edema.  Neurological: Alert & oriented x3. No slurred speech. Normal gait.  Psychiatric: Normal mood. Normal affect. Good insight. Good judgment.  Skin: Warm. Dry. No rash.         Labs Reviewed:     Chemistry:  Lab Results   Component Value Date     02/11/2025    K 4.7 02/11/2025    BUN 14 02/11/2025    CREATININE 0.97 02/11/2025    EGFRNORACEVR 90 02/11/2025    GLUCOSE 127 (H) 11/24/2022    CALCIUM 9.5 02/11/2025    ALKPHOS 80 11/24/2022    LABPROT 7.0 11/24/2022    ALBUMIN 4.2 02/11/2025    BILIDIR <0.20 12/07/2023    AST 36 02/11/2025    ALT 36 02/11/2025    WLQNUPZO65RD 66.2 02/11/2025    TSH 2.020 02/11/2025        Lab Results   Component Value Date    HGBA1C 5.8 (H) 02/11/2025        Hematology:  Lab Results   Component Value Date    WBC 5.0 02/11/2025    HGB 12.6 (L) 02/11/2025    HCT 39.2 02/11/2025     02/11/2025       Lipid Panel:  Lab Results   Component Value Date    CHOL 185 02/11/2025    HDL 48 02/11/2025    TRIG 182 (H) 02/11/2025        Urine:  Lab Results   Component Value Date    APPEARANCEUA Clear 11/24/2022    SGUA 1.011 11/24/2022    PROTEINUA 1+ (A) 11/24/2022    KETONESUA Negative 11/24/2022    LEUKOCYTESUR Negative 11/24/2022    RBCUA <5 11/24/2022    WBCUA <5 11/24/2022    BACTERIA None Seen 11/24/2022     Assessment:       ICD-10-CM ICD-9-CM   1. Elevated ferritin  R79.89 790.6   2. Mixed  hyperlipidemia  E78.2 272.2   3. Prediabetes  R73.03 790.29   4. Alcohol dependence with unspecified alcohol-induced disorder  F10.29 303.90     291.9   5. Seasonal allergies  J30.2 477.9   6. Sickle cell trait  D57.3 282.5     Plan:     1. Elevated ferritin  Overview:  Consistently high ferritin.  EConsult previously done to hematology oncology.  No anemia, normal smear  TIBC within normal limits  No metabolic syndrome/obesity/diabetes   No HIV infection   ESR and CRP normal  Drinks 2-3 24 oz beers daily.    Assessment & Plan:  Reviewed eConsult and discussed with the patient in detail hematologist recommendations that was previously ordered by Dr. Eason.   Patient is still a heavy drinker.  I highly encouraged the patient to decrease alcohol intake.  Advised against abrupt cessation due to withdrawal risk and recommended gradually reducing consumption over the next 3 months.     Evaluated ferritin level at 1057, which is above the normal range of less than 400.  Noted iron levels are normal despite elevated ferritin.  Scheduled ferritin level recheck and ordered CBC in 3 months for closer monitoring.      Orders:  -     Iron and TIBC; Future; Expected date: 05/12/2025  -     Ferritin; Future; Expected date: 05/12/2025  -     Reticulocytes; Future; Expected date: 05/12/2025  -     CBC Auto Differential; Future; Expected date: 05/12/2025    2. Mixed hyperlipidemia  Overview:    Stressed importance of dietary modifications. Follow a low cholesterol, low saturated fat diet with less that 200mg of cholesterol a day.  Avoid fried foods and high saturated fats (high saturated fats less than 7% of calories).  Add Flax Seed/Fish Oil supplements to diet. Increase dietary fiber.  Regular exercise can reduce LDL and raise HDL. Stressed importance of physical activity 5 times per week for 30 minutes per day.     Assessment & Plan:   Latest Reference Range & Units 12/07/23 06:35 03/27/24 07:29 07/30/24 06:30 02/11/25 06:41    Cholesterol Total 100 - 199 mg/dL 209 (H) 177 181 185   HDL >39 mg/dL 49 49 50 48   LDL Calculated 0 - 99 mg/dL 104 (H) 91 94 105 (H)   Triglycerides 0 - 149 mg/dL 328 (H) 219 (H) 221 (H) 182 (H)   VLDL Cholesterol Jan 5 - 40 mg/dL 56 (H) 37 37 32   (H): Data is abnormally high    Monitored lipid panel: LDL cholesterol increased to 105 from July of last year, while triglycerides decreased from 221 to 182.  Total cholesterol remains stable.  Considered starting statin drug but decided to continue monitoring as lipid panel is trending down.  Provided information on natural supplements for cholesterol management, including omega-3 fatty acids, flaxseed, and niacin.            3. Prediabetes  Overview:  Patient is well controlled with diet.  Follow ADA Diet. Avoid soda, simple sweets, and limit rice/pasta/breads/starches (no more than 45-50 grams per meal).  Maintain healthy weight with goal BMI <30.  Exercise 5 times per week for 30 minutes per day.  Stressed importance of daily foot exams especially if neuropathy is present.  Patient was instructed to notify physician if they notice any sores or skin lesions on the feet.  Stressed the importance of wearing proper fitting comfortable shoes i.e. diabetic footwear.  They were instructed to always wear shoes and never go barefooted.  Stressed importance of annual dilated eye exam.    Assessment & Plan:  Confirmed prediabetes diagnosis based on A1C level of 5.8.  Educated patient on pre-diabetic status and importance of dietary management.  Recommend increasing complex carbohydrates, water intake, and green leafy vegetables.      4. Alcohol dependence with unspecified alcohol-induced disorder  Assessment & Plan:  Discussed the impact of alcohol consumption on ferritin levels, noting an increase from 852 in September 2024 to 1057 currently.  Elliot reports drinking 2-3 24-ounce beers daily as of August last year.  Advised against abrupt cessation due to withdrawal risk  and recommended gradually reducing consumption over the next 3 months.  Instructed patient to reduce sugar intake, noting sugar content in beer.      5. Seasonal allergies  Assessment & Plan:  Noted ongoing sinus troubles and recent cold symptoms with deep mucus in the morning.  Explained the need for consistent daily use of antihistamines for effectiveness.  Recommended  Xyzal 5 mg nightly as an alternative to Claritin.  Recommend Mucinex expectorant OTC for mucus production and advised increasing water intake.      6. Sickle cell trait  Assessment & Plan:  Monitored blood count, which is stable with no evidence of blood loss.  Evaluated kidney function, showing mild dysfunction with current GFR at 90, down from 95 last year (range: 87-95).  Confirmed normal liver function based on ultrasound results.        Follow up in about 3 months (around 5/25/2025) for Alcohol Cessation/Elevated Ferritin Follow Up. In addition to their scheduled follow up, the patient has also been instructed to follow up on as needed basis.     This note was generated with the assistance of ambient listening technology. Verbal consent was obtained by the patient and accompanying visitor(s) for the recording of patient appointment to facilitate this note. I attest to having reviewed and edited the generated note for accuracy, though some syntax or spelling errors may persist. Please contact the author of this note for any clarification.      Bassam Hernandez, Adult-Gerontology NP

## 2025-05-07 ENCOUNTER — TELEPHONE (OUTPATIENT)
Dept: FAMILY MEDICINE | Facility: CLINIC | Age: 60
End: 2025-05-07
Payer: COMMERCIAL

## 2025-05-07 DIAGNOSIS — I10 ESSENTIAL HYPERTENSION, BENIGN: ICD-10-CM

## 2025-05-07 RX ORDER — BENAZEPRIL HYDROCHLORIDE 20 MG/1
20 TABLET ORAL DAILY
Qty: 90 TABLET | Refills: 1 | Status: SHIPPED | OUTPATIENT
Start: 2025-05-07

## 2025-05-07 NOTE — TELEPHONE ENCOUNTER
Dr Collado  after reviewing patient chart. He has had an elevated ferritin  over  the past 9 months ( see lab results)  last level on 2/11/2025    1.057. Dr Eason wanted him back in May for repeat levels. We had to cancel his appointment 5/22/25 for lab and 5/28/25 for results due to no provider .  Do you recommend  repeat or can he wait till  he sees the new PCP? Please advise.